# Patient Record
Sex: FEMALE | Race: WHITE | URBAN - METROPOLITAN AREA
[De-identification: names, ages, dates, MRNs, and addresses within clinical notes are randomized per-mention and may not be internally consistent; named-entity substitution may affect disease eponyms.]

---

## 2024-02-05 ENCOUNTER — OFFICE VISIT (OUTPATIENT)
Age: 19
End: 2024-02-05

## 2024-02-05 VITALS
WEIGHT: 102.9 LBS | OXYGEN SATURATION: 98 % | TEMPERATURE: 98.5 F | BODY MASS INDEX: 16.54 KG/M2 | SYSTOLIC BLOOD PRESSURE: 103 MMHG | HEIGHT: 66 IN | HEART RATE: 110 BPM | DIASTOLIC BLOOD PRESSURE: 71 MMHG

## 2024-02-05 DIAGNOSIS — U07.1 COVID-19: Primary | ICD-10-CM

## 2024-02-05 LAB
INFLUENZA A ANTIBODY: NEGATIVE
INFLUENZA B ANTIBODY: NEGATIVE
Lab: ABNORMAL
QC PASS/FAIL: ABNORMAL
SARS-COV-2 RDRP RESP QL NAA+PROBE: POSITIVE

## 2024-02-05 ASSESSMENT — ENCOUNTER SYMPTOMS
COUGH: 1
SHORTNESS OF BREATH: 0
VOMITING: 0
RHINORRHEA: 1
NAUSEA: 1
DIARRHEA: 1
SORE THROAT: 1

## 2024-02-05 NOTE — PROGRESS NOTES
Therese Miranda (:  2005) is a 18 y.o. female,New patient, here for evaluation of the following chief complaint(s):  Cough (Cough, runny nose, fever and body aches starting yesterday)      ASSESSMENT/PLAN:    ICD-10-CM    1. COVID-19  U07.1 POCT Influenza A/B     POCT COVID-19 Rapid, NAAT        Results for POC orders placed in visit on 24   POCT Influenza A/B   Result Value Ref Range    Influenza A Ab NEGATIVE     Influenza B Ab NEGATIVE      COVID: positive    Patient’s rapid COVID is positive. Encouraged them to drink plenty of fluids, alternate tylenol and ibuprofen, warm steamy showers, warm moist compresses, and rest. Return for worsening or persistent symptoms. Educated on current CDC quarantine guidelines. Patient is understanding and agreeable to this plan.     Dx Disposition: flu, URI  Education and handout provided on diagnosis and management of symptoms.   AVS reviewed with patient. Follow up as needed in UC or with PCP for new or worsening symptoms.   Return if symptoms worsen or fail to improve.    SUBJECTIVE/OBJECTIVE:  Patient presents to the clinic with complaints of feeling feverish, body aches, cough, runny/stuffy nose, and sore throat. This started 2 days ago. She has tried sudafed and advil with some relief.        History provided by:  Patient   used: No    Cough  Associated symptoms include chills, ear pain, a fever, headaches, myalgias, rhinorrhea and a sore throat. Pertinent negatives include no shortness of breath.       Vitals:    24 1226   BP: 103/71   Site: Left Upper Arm   Position: Sitting   Cuff Size: Medium Adult   Pulse: (!) 110   Temp: 98.5 °F (36.9 °C)   TempSrc: Oral   SpO2: 98%   Weight: 46.7 kg (102 lb 14.4 oz)   Height: 1.676 m (5' 6\")       Review of Systems   Constitutional:  Positive for chills, fatigue and fever.   HENT:  Positive for congestion, ear pain, rhinorrhea and sore throat.    Respiratory:  Positive for cough. Negative for